# Patient Record
(demographics unavailable — no encounter records)

---

## 2024-10-07 NOTE — PLAN
[FreeTextEntry4] : Assessment: Patient is a 62 yo male with h/o depression and anxiety seen today for medication management. Patient is compliant with the medications, tolerating it well without any side effects. I-STOP was checked without any problems.  PLAN: Start Naltrexone 50 mg PO QD for alcohol use disorder Start Zofran 4 mg PRN for nausea Continue Lexapro 20 mg PO QD for depression and anxiety D/C Trazodone 50 mg PO QHS for insomnia D/C Diazepam 2 mg PO QD #30 D/C Cymbalta 40 mg PO QHS for depression, anxiety and neuropathic pain Wean off Venlafaxine 75 mg D/C Clonidine 0.1 mg PO QHS for opioid withdrawal - Discussed risks and benefits of medications including side effects of GI and sexual with SSRI. Alternative strategies including no intervention discussed with patient. Patient consents to current medications as prescribed. - Discussed with patient regarding importance of abstinence and sobriety from alcohol and drugs. Educated about relationship between worsening mood/anxiety symptoms and drug use and improvement of symptoms with abstinence.  - Discussed about unpredictable effects including cardiorespiratory collapse from the combination of illicit drugs and prescribed medications. Patient verbalized understanding. - Patient understands to contact clinic prn with concerns and agrees to call 911 or go to nearest ER if symptoms worsen. - Next appointment was made by the patient in 6-8 weeks Patient was not in any distress.

## 2024-10-07 NOTE — PHYSICAL EXAM
[None] : none [FreeTextEntry2] : ambulates with a cane [Cooperative] : cooperative [Depressed] : depressed [Anxious] : anxious [Flat] : flat [Clear] : clear [Linear/Goal Directed] : linear/goal directed [Average] : average [WNL] : within normal limits

## 2024-10-07 NOTE — HISTORY OF PRESENT ILLNESS
[FreeTextEntry1] : As per chart note on June 2024:  "Patient will be going to [facility] for detox and rehab for alcohol use. Patient is 8 days sober from alcohol.  His GP prescribed him Lorazepam. Has not used it.   PLAN: Increase Lexapro from 15 to 20 mg. "   Patient's wife Faith, (HIPPA on file [phone]) is with patient duuring interview today in the office.   Patient states he is taking the medicaiotns. Faith feels there were 5 days he missed the dose so she can see he is irritable, angry and had increased anxiety.  The increased of the Lexapro keeps him balanced, motivated and like  si contributing somehting.   Patient is continuing to drink. Drinks 4 out 7 days a week where he has 5-6 drinks. Faith and he had a talk in the room while writer listenned. She states that daughter and she does not want to have a relationship if he continues to drink. Patient feels he does not have a problem with alcohol. Writer mentioned she does not know how much of the Lexapro is reaching the patient due to the drinking on the Lexapro as it is decreasing the efficacy of the medication.   Mood: less depression and anxiety, irritable and edgy.  Sleeping: No Trazodone. Gets 6-7 hours per night. Faith disagrees. Has sleep apnea. Has not gone for sleep study.  Appetite is too good. Was prescribed Monjoro by Dr. Ballesteros his PCP Energy, concentration and motivation are all decreased Denies any AVH, SI or HI. Has been drinking everyday 5-6 drinks or either Vodka, Burbon, and beer.  On Humera which is helping his hand pain.  [Home] : at home, [unfilled] , at the time of the visit. [Medical Office: ([facility])___] : at the medical office located in  [Verbal consent obtained from patient] : the patient, [unfilled]

## 2024-10-15 NOTE — ASSESSMENT
[FreeTextEntry1] : Referral from Dr. Carrera for right knee pain and Baker's cyst.  Had MRI which does indeed show Baker's cyst and mild knee effusion.  History of rheumatoid arthritis on Humira. R knee bakers cyst aspiration and knee CSI on 8/20/24 now with return of swelling and pain - Ultrasound-guided Baker's cyst aspiration - Ace wrap applied and advised to wear for the next 2 to 3 days - Tylenol and ice as needed for injection site relief - Physical therapy referral - Ask Rheumatology about adding in medrol dose pack to hopefully decrease knee swelling - Follow-up as needed

## 2024-10-15 NOTE — IMAGING
[de-identified] : L knee: - No obvious deformity or swelling - No pain with palpation of medial or lateral joint line. - ROM from 135 degrees of flexion to 0 degrees extension. - 5/5 strength with knee flexion and extension - Distally neurovascularly intact.    R knee: - No deformity or swelling - Pain with palpation of medial or lateral joint line. - ROM from 135 degrees of flexion to 0 degrees extension. - 5/5 strength with knee flexion and extension - Stable varus, valgus testing - Negative Priscilla's - Negative patellar grind - Distally neurovascularly intact.

## 2024-10-15 NOTE — IMAGING
[de-identified] : L knee: - No obvious deformity or swelling - No pain with palpation of medial or lateral joint line. - ROM from 135 degrees of flexion to 0 degrees extension. - 5/5 strength with knee flexion and extension - Distally neurovascularly intact.    R knee: - No deformity or swelling - Pain with palpation of medial or lateral joint line. - ROM from 135 degrees of flexion to 0 degrees extension. - 5/5 strength with knee flexion and extension - Stable varus, valgus testing - Negative Priscilla's - Negative patellar grind - Distally neurovascularly intact.

## 2024-10-15 NOTE — HISTORY OF PRESENT ILLNESS
[de-identified] : 10/14/24 - pt is here for fu on right knee, pt stated pain came back couple weeks ago / was doing fine for couple weeks. no numbness or tingling / swelling continues. needs pt referral.   08/20/2024: Patient is here for evaluation of right knee and MRI results. Patient has had right knee pain for the past 2 months with no prior injury. Patient has been seen by Dr. Carrera and was sent for MRI for evaluation of Baker's cyst. Patient notes he did not receive PT script, has tried using a brace but has stopped because it is uncomfortable. Patient reports symptoms are worsening. He's on Eliquis for aflutter.    Dr. Delgado Notes:  08/06/2024 ALEX 62 year M is here today for evaluation of right knee. Patient denies NAZARIO. Patient reports pain began about 40 days ago. Patient went to  ER for some heart problems and had x-rays and ultrasound done for this condition also. Patient is ambulating with a cane. Patient denies n/t however endorses some soreness, stabbing, weakness and swelling pain. Patient notes pain radiates down his right foot. Patient notes pain is worse when walking, standing, sitting and with stairs. Patient had been applying ice, topical, pain meds, states nothing helps. hx of L-SPINE surgery Dec [id number] neuropathy on feet hx of a flutter on eliquis-- recently hospitalized

## 2024-10-15 NOTE — HISTORY OF PRESENT ILLNESS
[de-identified] : 10/14/24 - pt is here for fu on right knee, pt stated pain came back couple weeks ago / was doing fine for couple weeks. no numbness or tingling / swelling continues. needs pt referral.   08/20/2024: Patient is here for evaluation of right knee and MRI results. Patient has had right knee pain for the past 2 months with no prior injury. Patient has been seen by Dr. Carrrea and was sent for MRI for evaluation of Baker's cyst. Patient notes he did not receive PT script, has tried using a brace but has stopped because it is uncomfortable. Patient reports symptoms are worsening. He's on Eliquis for aflutter.    Dr. Delgado Notes:  08/06/2024 ALEX 62 year M is here today for evaluation of right knee. Patient denies NAZARIO. Patient reports pain began about 40 days ago. Patient went to  ER for some heart problems and had x-rays and ultrasound done for this condition also. Patient is ambulating with a cane. Patient denies n/t however endorses some soreness, stabbing, weakness and swelling pain. Patient notes pain radiates down his right foot. Patient notes pain is worse when walking, standing, sitting and with stairs. Patient had been applying ice, topical, pain meds, states nothing helps. hx of L-SPINE surgery Dec [id number] neuropathy on feet hx of a flutter on eliquis-- recently hospitalized

## 2024-10-15 NOTE — PROCEDURE
[FreeTextEntry3] : A pre-procedure verification was performed by asking the patient to state their name, , and the location of the procedure being performed in their own words.  A review of allergies was accomplished through dialog and the patient, ending thus in the full agreement. The risks and benefits were explained and consent were obtained verbally.  Procedure: Ultrasound Guided Aspiration>>  R Anand's Cyst Consent was obtained. Patients questions were answered to the best of my ability prior to procedure. Injection site and surrounding bony landmarks were palpated, and marked prior to proceeding. Target location and needle pathway identified under ultrasound. Ultrasound probe was sanitized in the typical fashion prior to application of sterile gel. Injection site was cleaned and prepped in the typical fashion using alcohol swabs. Cold spray used on skin for patient comfort. Needle tract anesthetized with 3cc lidocaine w/o epi. Site was recleaned/prepped. Site was identified once again with ultrasound. 18 gauge needle was advanced into the bakers cyst space from the posterior approach. A total of 9 cc of pale yellow synovial fluid was aspirated.

## 2024-10-23 NOTE — PHYSICAL EXAM
[No Acute Distress] : no acute distress [Well Nourished] : well nourished [Well Developed] : well developed [Well-Appearing] : well-appearing [Normal Sclera/Conjunctiva] : normal sclera/conjunctiva [PERRL] : pupils equal round and reactive to light [EOMI] : extraocular movements intact [Normal Outer Ear/Nose] : the outer ears and nose were normal in appearance [Normal Oropharynx] : the oropharynx was normal [No JVD] : no jugular venous distention [No Lymphadenopathy] : no lymphadenopathy [Supple] : supple [Thyroid Normal, No Nodules] : the thyroid was normal and there were no nodules present [No Respiratory Distress] : no respiratory distress  [No Accessory Muscle Use] : no accessory muscle use [Clear to Auscultation] : lungs were clear to auscultation bilaterally [Normal Rate] : normal rate  [Regular Rhythm] : with a regular rhythm [Normal S1, S2] : normal S1 and S2 [No Murmur] : no murmur heard [No Carotid Bruits] : no carotid bruits [No Abdominal Bruit] : a ~M bruit was not heard ~T in the abdomen [No Varicosities] : no varicosities [Pedal Pulses Present] : the pedal pulses are present [No Edema] : there was no peripheral edema [No Palpable Aorta] : no palpable aorta [No Extremity Clubbing/Cyanosis] : no extremity clubbing/cyanosis [Soft] : abdomen soft [Non Tender] : non-tender [Non-distended] : non-distended [No Masses] : no abdominal mass palpated [No HSM] : no HSM [Normal Bowel Sounds] : normal bowel sounds [Normal Posterior Cervical Nodes] : no posterior cervical lymphadenopathy [Normal Anterior Cervical Nodes] : no anterior cervical lymphadenopathy [No CVA Tenderness] : no CVA  tenderness [No Spinal Tenderness] : no spinal tenderness [No Joint Swelling] : no joint swelling [Grossly Normal Strength/Tone] : grossly normal strength/tone [No Rash] : no rash [Coordination Grossly Intact] : coordination grossly intact [No Focal Deficits] : no focal deficits [Normal Gait] : normal gait [Deep Tendon Reflexes (DTR)] : deep tendon reflexes were 2+ and symmetric [Normal Affect] : the affect was normal [Normal Insight/Judgement] : insight and judgment were intact [de-identified] : sinus pressure to palpation

## 2024-10-23 NOTE — HEALTH RISK ASSESSMENT
[No falls in past year] : Patient reported no falls in the past year [3] : 2) Feeling down, depressed, or hopeless for nearly every day (3) [No] : No [Never (0 pts)] : Never (0 points) [de-identified] : no [de-identified] : Ortho & Psychiatrist  [Audit-CScore] : 0 [de-identified] : walking [de-identified] : healthy [[id number]] : 6

## 2024-10-23 NOTE — HISTORY OF PRESENT ILLNESS
[FreeTextEntry1] : acute visit [de-identified] : acute visit  c/o sinus pressure/ headaches/ cough/ sputum production

## 2024-10-23 NOTE — REVIEW OF SYSTEMS
[Nasal Discharge] : nasal discharge [Cough] : cough [Negative] : Heme/Lymph [FreeTextEntry4] : sinus pressure

## 2024-11-12 NOTE — DISCUSSION/SUMMARY
[EKG obtained to assist in diagnosis and management of assessed problem(s)] : EKG obtained to assist in diagnosis and management of assessed problem(s) [FreeTextEntry1] :  No new arrhythmia events noted on ILR. Patient denies recurrent symptoms of arrhythmia. He has history of Aflutter in 2022 with MARCUS/DCCV while in [place] and recurrence of Aflutter in June of 2024 with successful MARCUS/DCCV. He has not taken Eliquis d/t high cost but has continued to take Metoprolol 50 mg BID.   Since patient has recurrence of AFL, discussed various therapeutic options including medications and catheter ablation and the details of the procedure including potential complications which include but are not limited to bleeding, hematoma, infection, damage to blood vessels, cardiac perforation, damage to the conduction system requiring pacemaker, MI, stroke and DVT. The patient expressed understanding and wishes to proceed with AFL ablation procedure.   Recommendation -Plan for AFL ablation and plan to remain on Eliquis post procedure x 1 month.  -Continue Metoprolol 50 mg BID -Continue remote monitoring of ILR. -EP f/u post ablation procedure for WCK and arrhythmia f/u.

## 2024-11-12 NOTE — HISTORY OF PRESENT ILLNESS
[FreeTextEntry1] :  64 y/o male with hx of ETOH use disorder, RA, anxiety, atrial flutter in past s/p DCCV in 2022 ([facility]), remote hx vertebral OM s/p surgery/hardware now ambulates with cane. Patient was sent in from [facility] after he went there for Detox. He admitted to drinking 3 large beers prior to going there. While there he was c/o palpitations and was found to be in recurrent aflutter with RVR. Denies any CP, SOB, HA, N/V, or diaphoresis. at [facility] ED noted to be in aflutter with RVR in 120s, BP stable. Given IV/PO with improved symptoms. Seen by Cardiology and was admitted for rate control and MARCUS/DCCV on 6/20/24 was performed. ILR was also implanted at the time. Hospital course complicated post DCCV by BHASKAR.    Patient presents for f/u s/p MARCUS/DCCV and ILR implant on 6/20/24. Patient denies symptoms of arrhythmia without c/o palpitations, chest pain/tightness, dizziness, syncope or near syncope. ILR interrogation since implant reveals no new arrhythmia events since implant.   Currently on Metoprolol 50mg BID, stopped taking Eliquis d/t high cost and in the process of being approved for medication discount.  EKG today reveals SB HR 51 BPM

## 2024-11-12 NOTE — PHYSICAL EXAM
[Well Nourished] : well nourished [No Acute Distress] : no acute distress [Normal S1, S2] : normal S1, S2 [Clear Lung Fields] : clear lung fields [No Respiratory Distress] : no respiratory distress  [No Edema] : no edema [Moves all extremities] : moves all extremities [Alert and Oriented] : alert and oriented

## 2024-11-12 NOTE — REVIEW OF SYSTEMS
[Headache] : no headache [SOB] : no shortness of breath [Dyspnea on exertion] : not dyspnea during exertion [Chest Discomfort] : no chest discomfort [Lower Ext Edema] : no extremity edema [Palpitations] : no palpitations [Syncope] : no syncope [Dizziness] : no dizziness [Easy Bleeding] : no tendency for easy bleeding [Easy Bruising] : no tendency for easy bruising

## 2024-11-14 NOTE — REVIEW OF SYSTEMS
[Nasal Discharge] : nasal discharge [Sore Throat] : sore throat [Cough] : cough [Negative] : Heme/Lymph [FreeTextEntry6] : sputum

## 2024-11-25 NOTE — PLAN
[FreeTextEntry4] : Assessment: Patient is a 62 yo male with h/o depression and anxiety seen today for medication management. Patient is compliant with the medications, tolerating it well without any side effects. I-STOP was checked without any problems.  PLAN: Continue Naltrexone 50 mg PO QD for alcohol use disorder Continue Zofran 4 mg PRN for nausea Continue Lexapro 20 mg PO QD for depression and anxiety D/C Trazodone 50 mg PO QHS for insomnia D/C Diazepam 2 mg PO QD #30 D/C Cymbalta 40 mg PO QHS for depression, anxiety and neuropathic pain Wean off Venlafaxine 75 mg D/C Clonidine 0.1 mg PO QHS for opioid withdrawal - Discussed risks and benefits of medications including side effects of GI and sexual with SSRI. Alternative strategies including no intervention discussed with patient. Patient consents to current medications as prescribed. - Discussed with patient regarding importance of abstinence and sobriety from alcohol and drugs. Educated about relationship between worsening mood/anxiety symptoms and drug use and improvement of symptoms with abstinence.  - Discussed about unpredictable effects including cardiorespiratory collapse from the combination of illicit drugs and prescribed medications. Patient verbalized understanding. - Patient understands to contact clinic prn with concerns and agrees to call 911 or go to nearest ER if symptoms worsen. - Next appointment was made by the patient in 6-8 weeks Patient was not in any distress.

## 2024-11-25 NOTE — HISTORY OF PRESENT ILLNESS
[FreeTextEntry1] :   Patient's wife Faith, (HIPPA on file [phone]) is with patient duuring interview today in the office.   At that time Naltrexone 50 mg was started on the patient. States he takes it PRN. States with the Lexapro 20 mg he does not feel much of the effect. Seen to be irritable, ruminaiton, depressed. Patient states eh feels that way because he does not liek peopel telling him what to do . Faith was begging him to stop drinking. States when he is drinking he is a nasty man but when he is not he is more calm. States he had bronchitis and was not drinking as he has on antibiotics and steroids. He was doing better during that period.   Patient is continuing to drink. Drinks 4 out 7 days a week where he has 5-6 drinks.  Faith states that she and their daughter do not want to have a relationship if he continues to drink. Patient feels he does not have a problem with alcohol. Writer mentioned she does not know how much of the Lexapro is reaching the patient due to the drinking on the Lexapro as it is decreasing the efficacy of the medication.   Mood: depression and anxiety, irritable and edgy.  Sleeping: Has not gone to sleep study. No Trazodone. Has sleep apnea.   Appetite is too good. Was prescribed Monjoro by Dr. Ballesteros his PCP Energy, concentration and motivation are all decreased Denies any AVH, SI or HI. Last drink was 1.5 weeks ago as per Faith due to the bronchitis.  On Humera which is helping his hand pain.  Has not seen Eloy.  [Home] : at home, [unfilled] , at the time of the visit. [Medical Office: ([facility])___] : at the medical office located in  [Verbal consent obtained from patient] : the patient, [unfilled]

## 2024-11-25 NOTE — PLAN
[FreeTextEntry4] : Assessment: Patient is a 60 yo male with h/o depression and anxiety seen today for medication management. Patient is compliant with the medications, tolerating it well without any side effects. I-STOP was checked without any problems.  PLAN: Continue Naltrexone 50 mg PO QD for alcohol use disorder Continue Zofran 4 mg PRN for nausea Continue Lexapro 20 mg PO QD for depression and anxiety D/C Trazodone 50 mg PO QHS for insomnia D/C Diazepam 2 mg PO QD #30 D/C Cymbalta 40 mg PO QHS for depression, anxiety and neuropathic pain Wean off Venlafaxine 75 mg D/C Clonidine 0.1 mg PO QHS for opioid withdrawal - Discussed risks and benefits of medications including side effects of GI and sexual with SSRI. Alternative strategies including no intervention discussed with patient. Patient consents to current medications as prescribed. - Discussed with patient regarding importance of abstinence and sobriety from alcohol and drugs. Educated about relationship between worsening mood/anxiety symptoms and drug use and improvement of symptoms with abstinence.  - Discussed about unpredictable effects including cardiorespiratory collapse from the combination of illicit drugs and prescribed medications. Patient verbalized understanding. - Patient understands to contact clinic prn with concerns and agrees to call 911 or go to nearest ER if symptoms worsen. - Next appointment was made by the patient in 6-8 weeks Patient was not in any distress.

## 2024-11-25 NOTE — PHYSICAL EXAM
[None] : none [FreeTextEntry2] : ambulates with a cane [Cooperative] : cooperative [Depressed] : depressed [Anxious] : anxious [Flat] : flat [Clear] : clear [Linear/Goal Directed] : linear/goal directed [Average] : average [WNL] : within normal limits Rich SW

## 2024-11-25 NOTE — HISTORY OF PRESENT ILLNESS
[FreeTextEntry1] :   Patient's wife Faith, (HIPPA on file [phone]) is with patient duuring interview today in the office.   At that time Naltrexone 50 mg was started on the patient. States he takes it PRN. States with the Lexapro 20 mg he does not feel much of the effect. Seen to be irritable, ruminaiton, depressed. Patient states eh feels that way because he does not liek peopel telling him what to do . Faith was begging him to stop drinking. States when he is drinking he is a nasty man but when he is not he is more calm. States he had bronchitis and was not drinking as he has on antibiotics and steroids. He was doing better during that period.   Patient is continuing to drink. Drinks 4 out 7 days a week where he has 5-6 drinks.  Faith states that she and their daughter do not want to have a relationship if he continues to drink. Patient feels he does not have a problem with alcohol. Writer mentioned she does not know how much of the Lexapro is reaching the patient due to the drinking on the Lexapro as it is decreasing the efficacy of the medication.   Mood: depression and anxiety, irritable and edgy.  Sleeping: Has not gone to sleep study. No Trazodone. Has sleep apnea.   Appetite is too good. Was prescribed Monjoro by Dr. Ballesteros his PCP Energy, concentration and motivation are all decreased Denies any AVH, SI or HI. Last drink was 1.5 weeks ago as per Faith due to the bronchitis.  On Humera which is helping his hand pain.  Has not seen Eloy.  [Home] : at home, [unfilled] , at the time of the visit. [Medical Office: ([facility])___] : at the medical office located in  [Verbal consent obtained from patient] : the patient, [unfilled]

## 2025-01-31 NOTE — HISTORY OF PRESENT ILLNESS
[FreeTextEntry1] : The following service was provided using telehealth between writer/provider and patient. The patient was at home. The writer was at clinic. Patient was alone and consented to telehealth format. All treatment plans through and including today's date were reviewed with the patient.  Patient is here for 3 month follow-up visit via telehealth.  Patient's wife Faith, (HIPPA on file [phone]) is with patient during interview today in the office.   No medication changes at that time. States he is not taking the Naltrexone.   Patient is continuing to drink. Drinks 4 out 7 days a week where he has 5-6 drinks.  Patient feels he does not have a problem with alcohol. Refuses to go to inpatient detox or rehab. Refusing MAT. Faith says he gets angry, he has outbursts., He makes bad decision. 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 +Cognitive impairment.  No CT/MRI was done.   Mood: depression and anxiety, irritable and edgy.  Sleepin-5 hours broken. Has not gone to sleep study. No Trazodone. Has sleep apnea.   Appetite is too good.  Energy, concentration and motivation are all decreased Denies any AVH, SI or HI. On Humera which is helping his hand pain.  Has not seen Eloy.  Last drink was yesterday.  [Home] : at home, [unfilled] , at the time of the visit. [Medical Office: ([facility])___] : at the medical office located in  [Verbal consent obtained from patient] : the patient, [unfilled]

## 2025-01-31 NOTE — PLAN
[FreeTextEntry4] : Assessment: Patient is a 60 yo male with h/o depression and anxiety seen today for medication management. Patient is compliant with the medications, tolerating it well without any side effects. I-STOP was checked without any problems.  PLAN: Start Seroquel 25 mg PO QHS for depression and insomnia D/C Naltrexone 50 mg PO QD for alcohol use disorder. Does not want to take it. Wants to continue drinking.  D/C Zofran 4 mg PRN for nausea Continue Lexapro 20 mg PO QD for depression and anxiety D/C Trazodone 50 mg PO QHS for insomnia D/C Diazepam 2 mg PO QD #30 D/C Cymbalta 40 mg PO QHS for depression, anxiety and neuropathic pain Wean off Venlafaxine 75 mg D/C Clonidine 0.1 mg PO QHS for opioid withdrawal - Discussed risks and benefits of medications including side effects of GI and sexual with SSRI. Alternative strategies including no intervention discussed with patient. Patient consents to current medications as prescribed. - Discussed with patient regarding importance of abstinence and sobriety from alcohol and drugs. Educated about relationship between worsening mood/anxiety symptoms and drug use and improvement of symptoms with abstinence.  - Discussed about unpredictable effects including cardiorespiratory collapse from the combination of illicit drugs and prescribed medications. Patient verbalized understanding. - Patient understands to contact clinic prn with concerns and agrees to call 911 or go to nearest ER if symptoms worsen. - Next appointment was made by the patient in 2 months. Patient was not in any distress.

## 2025-02-04 NOTE — HISTORY OF PRESENT ILLNESS
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

## 2025-02-04 NOTE — HEALTH RISK ASSESSMENT
[Yes] : Yes [Monthly or less (1 pt)] : Monthly or less (1 point) [1 or 2 (0 pts)] : 1 or 2 (0 points) [Never (0 pts)] : Never (0 points) [No] : In the past 12 months have you used drugs other than those required for medical reasons? No [Any fall with injury in past year] : Patient reported fall with injury in the past year [0] : 1) Little interest or pleasure doing things: Not at all (0) [3] : 2) Feeling down, depressed, or hopeless for nearly every day (3) [Never] : Never [Audit-CScore] : 1 [de-identified] : g [de-identified] : walking [de-identified] : healthy

## 2025-02-04 NOTE — ASSESSMENT
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

## 2025-03-12 NOTE — REVIEW OF SYSTEMS
[Nasal Discharge] : nasal discharge [Cough] : cough [Headache] : headache [Negative] : Heme/Lymph [FreeTextEntry9] : body aches

## 2025-04-28 NOTE — REASON FOR VISIT
[Telehealth (audio & video) - Individual/Group] : This visit was provided via telehealth using real-time 2-way audio visual technology. [Patient preference] : Patient preference. [Medical Office: ([facility])___] : The provider was located at the medical office in [unfilled]. [Home] : The patient, [unfilled], was located at home, [unfilled], at the time of the visit. [Participant(s) identity verified] : Participant(s) identity verified. [Verbal consent obtained from patient/other participant(s)] : Verbal consent for telehealth/telephonic services obtained from patient/other participant(s) [FreeTextEntry1] : "I suffer from anxiety, anger and PTSD."

## 2025-04-28 NOTE — PLAN
[FreeTextEntry4] : Assessment: Patient is a 62 yo male with h/o depression and anxiety seen today for medication management. Patient is compliant with the medications, tolerating it well without any side effects. I-STOP was checked without any problems.  PLAN: Continue Seroquel 25 mg PO QHS for depression and insomnia. Told patient to use 12.5 mg.  D/C Naltrexone 50 mg PO QD for alcohol use disorder. Does not want to take it. Wants to continue drinking.  D/C Zofran 4 mg PRN for nausea Continue Lexapro 20 mg PO QD for depression and anxiety D/C Trazodone 50 mg PO QHS for insomnia D/C Diazepam 2 mg PO QD #30 D/C Cymbalta 40 mg PO QHS for depression, anxiety and neuropathic pain Wean off Venlafaxine 75 mg D/C Clonidine 0.1 mg PO QHS for opioid withdrawal - Discussed risks and benefits of medications including side effects of GI and sexual with SSRI. Alternative strategies including no intervention discussed with patient. Patient consents to current medications as prescribed. - Discussed with patient regarding importance of abstinence and sobriety from alcohol and drugs. Educated about relationship between worsening mood/anxiety symptoms and drug use and improvement of symptoms with abstinence.  - Discussed about unpredictable effects including cardiorespiratory collapse from the combination of illicit drugs and prescribed medications. Patient verbalized understanding. - Patient understands to contact clinic prn with concerns and agrees to call 911 or go to nearest ER if symptoms worsen. - Next appointment was made by the patient in 2 months. Patient was not in any distress.

## 2025-04-28 NOTE — HISTORY OF PRESENT ILLNESS
[FreeTextEntry1] : Patient is here for 3 month follow-up visit via telehealth.  Patient's wife Faith, (HIPPA on file [phone]) is with patient during interview today.  At that time Seroquel 25 mg PO QHS was started on the patient. States he is sleeping much better but he is sleeping 10-12 hours per night. +Groggy next day morning. Faith says he is less irritable. Taking his medications every day.   Patient is continuing to drink but drinks less. Drinks 3 nights per week. where he has 5-6 drinks.  Patient feels he does not have a problem with alcohol. Refuses to go to inpatient detox or rehab. Refusing MAT. 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 +Cognitive impairment.  No CT/MRI was done.   Mood: less depression and anxiety, irritable and edgy. Has a new puppy as a therapeutic pet.   Sleeping: increased. 10-12 hours per night.  Has sleep apnea.   Appetite is too good.  Energy, concentration and motivation are all decreased Denies any AVH, SI or HI. On Humera which is helping his hand pain.  Has not seen [facility].  Last drink was yesterday.